# Patient Record
Sex: MALE | Race: WHITE | Employment: OTHER | URBAN - METROPOLITAN AREA
[De-identification: names, ages, dates, MRNs, and addresses within clinical notes are randomized per-mention and may not be internally consistent; named-entity substitution may affect disease eponyms.]

---

## 2017-03-24 ENCOUNTER — HOSPITAL ENCOUNTER (OUTPATIENT)
Dept: LAB | Age: 67
Discharge: HOME | End: 2017-03-24

## 2017-09-29 ENCOUNTER — HOSPITAL ENCOUNTER (OUTPATIENT)
Dept: LAB | Age: 67
Discharge: HOME | End: 2017-09-29

## 2018-05-18 ENCOUNTER — HOSPITAL ENCOUNTER (OUTPATIENT)
Dept: LAB | Age: 68
Discharge: HOME | End: 2018-05-18

## 2018-06-11 ENCOUNTER — HOSPITAL ENCOUNTER (OUTPATIENT)
Dept: LAB | Age: 68
Discharge: HOME | End: 2018-06-11

## 2018-11-16 ENCOUNTER — HOSPITAL ENCOUNTER (OUTPATIENT)
Dept: LAB | Age: 68
Discharge: HOME | End: 2018-11-16

## 2021-03-08 ENCOUNTER — ANESTHESIA EVENT (OUTPATIENT)
Dept: SURGERY | Age: 71
End: 2021-03-08
Payer: MEDICARE

## 2021-03-08 NOTE — ANESTHESIA PREPROCEDURE EVALUATION
Relevant Problems   No relevant active problems       Anesthetic History   No history of anesthetic complications            Review of Systems / Medical History  Patient summary reviewed, nursing notes reviewed and pertinent labs reviewed    Pulmonary  Within defined limits                 Neuro/Psych   Within defined limits           Cardiovascular    Hypertension: well controlled          CAD (s/p CABG) and hyperlipidemia    Exercise tolerance: >4 METS  Comments: H/o CAD s/p CABG. Cleared by [facility] 3/5/21.    GI/Hepatic/Renal     GERD: well controlled           Endo/Other        Obesity (BMI 35.3)     Other Findings   Comments: ED  BPH  Raynaud's Dz         Physical Exam    Airway  Mallampati: II  TM Distance: 4 - 6 cm  Neck ROM: normal range of motion   Mouth opening: Normal     Cardiovascular               Dental  No notable dental hx       Pulmonary                 Abdominal  GI exam deferred       Other Findings            Anesthetic Plan    ASA: 2  Anesthesia type: MAC          Induction: Intravenous  Anesthetic plan and risks discussed with: Patient

## 2021-03-11 RX ORDER — FAMOTIDINE 40 MG/1
40 TABLET, FILM COATED ORAL DAILY
COMMUNITY

## 2021-03-11 RX ORDER — SIMVASTATIN 40 MG/1
40 TABLET, FILM COATED ORAL
COMMUNITY

## 2021-03-11 RX ORDER — AMOXICILLIN 500 MG/1
1000 CAPSULE ORAL
COMMUNITY

## 2021-03-11 RX ORDER — CYCLOBENZAPRINE HCL 10 MG
TABLET ORAL
COMMUNITY

## 2021-03-11 RX ORDER — POTASSIUM CHLORIDE 750 MG/1
20 TABLET, EXTENDED RELEASE ORAL DAILY
COMMUNITY

## 2021-03-11 RX ORDER — EZETIMIBE 10 MG/1
10 TABLET ORAL
COMMUNITY

## 2021-03-11 RX ORDER — COLESEVELAM HYDROCHLORIDE 3.75 G/1
3.75 POWDER, FOR SUSPENSION ORAL
COMMUNITY

## 2021-03-11 RX ORDER — METOPROLOL SUCCINATE 100 MG/1
100 TABLET, EXTENDED RELEASE ORAL DAILY
COMMUNITY

## 2021-03-11 RX ORDER — TAMSULOSIN HYDROCHLORIDE 0.4 MG/1
0.4 CAPSULE ORAL DAILY
COMMUNITY

## 2021-03-11 RX ORDER — ASPIRIN 81 MG/1
81 TABLET ORAL DAILY
COMMUNITY

## 2021-03-11 RX ORDER — LISINOPRIL 40 MG/1
40 TABLET ORAL DAILY
COMMUNITY

## 2021-03-11 NOTE — PERIOP NOTES
[address] [place] [address]  SURGICAL PRE-ADMISSION INSTRUCTIONS    ARRIVAL  · You will be called the day before your surgery with your expected arrival time. · Sign in at the  of the hospital.  You will be directed to the Surgical Waiting Room. · Please arrive at your scheduled appointment time. You have been scheduled to arrive for your procedure one or two hours prior to the expected start time of your procedure. · Every effort will be made to minimize your wait but please be aware that unforeseen circumstances may affect our schedule. EATING  · DO NOT EAT OR DRINK ANYTHING AFTER MIDNIGHT ON THE EVENING BEFORE YOUR SURGERY OR ON THE DAY OF YOUR SURGERY except for your medications (as instructed) with a sip of water. · Do not use gum, mints or lozenges on the morning of your surgery. · Please do not smoke or chew tobacco before your surgery. MEDICATIONS   · Take the following medications on the morning of your surgery with the smallest amount of water possible : Famotidine, Metoprolol    STOP THESE MEDICATIONS AT THE TIMES LISTED BELOW  none   DRIVING/TRANSPORATION  · Have a responsible adult to drive you home from the hospital and to stay with you over night. Please have them plan to remain in the hospital during your surgery. Your surgery will not be done if you do not have a responsible adult to take you home and to stay with you. · If you have arranged for public transport, you must have a responsible adult to ride with you (who is not the ). · You may not drive for 24 hours after anesthesia. PREPARATION  · If you have a Living WiIl/Advance Directive, please bring a copy with you to scan into your chart. · Please DO NOT wear makeup or nail polish  · Please leave valuables at home,  DO NOT wear jewelry. · Wear loose, comfortable clothing that is large enough to cover a bulky dressing.     SPECIAL INSTRUCTIONS:  · none        Patient:  CHRISSIE Mirza Ermias Douglas   Date:     March 11, 2021  Time:   11:25 AM    RN:  Kayce Lemus RN    Date:     March 11, 2021  Time:   11:25 AM

## 2021-03-12 PROBLEM — H25.12 AGE-RELATED NUCLEAR CATARACT, LEFT EYE: Chronic | Status: ACTIVE | Noted: 2021-03-12

## 2021-03-15 ENCOUNTER — HOSPITAL ENCOUNTER (OUTPATIENT)
Age: 71
Setting detail: OUTPATIENT SURGERY
Discharge: HOME | End: 2021-03-15
Attending: OPHTHALMOLOGY | Admitting: OPHTHALMOLOGY
Payer: MEDICARE

## 2021-03-15 ENCOUNTER — ANESTHESIA (OUTPATIENT)
Dept: SURGERY | Age: 71
End: 2021-03-15
Payer: MEDICARE

## 2021-03-15 VITALS
WEIGHT: 235.45 LBS | TEMPERATURE: 97.2 F | HEIGHT: 70 IN | BODY MASS INDEX: 33.71 KG/M2 | RESPIRATION RATE: 18 BRPM | HEART RATE: 59 BPM | DIASTOLIC BLOOD PRESSURE: 74 MMHG | OXYGEN SATURATION: 97 % | SYSTOLIC BLOOD PRESSURE: 147 MMHG

## 2021-03-15 PROBLEM — H25.12 AGE-RELATED NUCLEAR CATARACT, LEFT EYE: Chronic | Status: RESOLVED | Noted: 2021-03-12 | Resolved: 2021-03-15

## 2021-03-15 PROCEDURE — 2709999900 HC NON-CHARGEABLE SUPPLY: Performed by: OPHTHALMOLOGY

## 2021-03-15 PROCEDURE — 74011250636 HC RX REV CODE- 250/636: Performed by: OPHTHALMOLOGY

## 2021-03-15 PROCEDURE — 77030013353: Performed by: OPHTHALMOLOGY

## 2021-03-15 PROCEDURE — 74011250636 HC RX REV CODE- 250/636: Performed by: ANESTHESIOLOGY

## 2021-03-15 PROCEDURE — 76060000031 HC ANESTHESIA FIRST 0.5 HR: Performed by: OPHTHALMOLOGY

## 2021-03-15 PROCEDURE — 74011000250 HC RX REV CODE- 250

## 2021-03-15 PROCEDURE — 77030013987 HC SLV OPTH IRR ALCN -B: Performed by: OPHTHALMOLOGY

## 2021-03-15 PROCEDURE — 76010000154 HC OR TIME FIRST 0.5 HR: Performed by: OPHTHALMOLOGY

## 2021-03-15 PROCEDURE — 77030020828: Performed by: OPHTHALMOLOGY

## 2021-03-15 PROCEDURE — 74011000250 HC RX REV CODE- 250: Performed by: OPHTHALMOLOGY

## 2021-03-15 PROCEDURE — V2632 POST CHMBR INTRAOCULAR LENS: HCPCS | Performed by: OPHTHALMOLOGY

## 2021-03-15 PROCEDURE — 76210000020 HC REC RM PH II FIRST 0.5 HR: Performed by: OPHTHALMOLOGY

## 2021-03-15 PROCEDURE — 77030035283: Performed by: OPHTHALMOLOGY

## 2021-03-15 PROCEDURE — 77030014067 HC TIP PHACO KLMN ALCN -B: Performed by: OPHTHALMOLOGY

## 2021-03-15 PROCEDURE — 77030007855 HC KNF OPHTH IKNF ALCN -A: Performed by: OPHTHALMOLOGY

## 2021-03-15 RX ORDER — TROPICAMIDE 10 MG/ML
1 SOLUTION/ DROPS OPHTHALMIC
Status: DISCONTINUED | OUTPATIENT
Start: 2021-03-15 | End: 2021-03-15 | Stop reason: HOSPADM

## 2021-03-15 RX ORDER — TOBRAMYCIN AND DEXAMETHASONE 3; 1 MG/ML; MG/ML
1 SUSPENSION/ DROPS OPHTHALMIC AS NEEDED
Status: COMPLETED | OUTPATIENT
Start: 2021-03-15 | End: 2021-03-15

## 2021-03-15 RX ORDER — TETRACAINE HYDROCHLORIDE 5 MG/ML
1 SOLUTION OPHTHALMIC AS NEEDED
Status: COMPLETED | OUTPATIENT
Start: 2021-03-15 | End: 2021-03-15

## 2021-03-15 RX ORDER — PROPOFOL 10 MG/ML
INJECTION, EMULSION INTRAVENOUS AS NEEDED
Status: DISCONTINUED | OUTPATIENT
Start: 2021-03-15 | End: 2021-03-15 | Stop reason: HOSPADM

## 2021-03-15 RX ORDER — PHENYLEPHRINE HYDROCHLORIDE 100 MG/ML
1 SOLUTION/ DROPS OPHTHALMIC
Status: DISCONTINUED | OUTPATIENT
Start: 2021-03-15 | End: 2021-03-15 | Stop reason: HOSPADM

## 2021-03-15 RX ORDER — TETRACAINE HYDROCHLORIDE 5 MG/ML
SOLUTION OPHTHALMIC AS NEEDED
Status: DISCONTINUED | OUTPATIENT
Start: 2021-03-15 | End: 2021-03-15 | Stop reason: HOSPADM

## 2021-03-15 RX ORDER — MIDAZOLAM HYDROCHLORIDE 1 MG/ML
INJECTION, SOLUTION INTRAMUSCULAR; INTRAVENOUS AS NEEDED
Status: DISCONTINUED | OUTPATIENT
Start: 2021-03-15 | End: 2021-03-15 | Stop reason: HOSPADM

## 2021-03-15 RX ORDER — KETOROLAC TROMETHAMINE 5 MG/ML
1 SOLUTION OPHTHALMIC
Status: DISCONTINUED | OUTPATIENT
Start: 2021-03-15 | End: 2021-03-15 | Stop reason: HOSPADM

## 2021-03-15 RX ORDER — SODIUM CHLORIDE, SODIUM LACTATE, POTASSIUM CHLORIDE, CALCIUM CHLORIDE 600; 310; 30; 20 MG/100ML; MG/100ML; MG/100ML; MG/100ML
25 INJECTION, SOLUTION INTRAVENOUS CONTINUOUS
Status: DISCONTINUED | OUTPATIENT
Start: 2021-03-15 | End: 2021-03-15 | Stop reason: HOSPADM

## 2021-03-15 RX ORDER — LIDOCAINE HYDROCHLORIDE 20 MG/ML
2 INJECTION, SOLUTION EPIDURAL; INFILTRATION; INTRACAUDAL; PERINEURAL ONCE
Status: COMPLETED | OUTPATIENT
Start: 2021-03-15 | End: 2021-03-15

## 2021-03-15 RX ADMIN — PHENYLEPHRINE HYDROCHLORIDE 1 DROP: 100 SOLUTION/ DROPS OPHTHALMIC at 12:43

## 2021-03-15 RX ADMIN — TETRACAINE HYDROCHLORIDE 1 DROP: 5 SOLUTION OPHTHALMIC at 12:57

## 2021-03-15 RX ADMIN — TETRACAINE HYDROCHLORIDE 1 DROP: 5 SOLUTION OPHTHALMIC at 12:43

## 2021-03-15 RX ADMIN — TROPICAMIDE 1 DROP: 10 SOLUTION/ DROPS OPHTHALMIC at 12:34

## 2021-03-15 RX ADMIN — KETOROLAC TROMETHAMINE 1 DROP: 5 SOLUTION OPHTHALMIC at 12:43

## 2021-03-15 RX ADMIN — TROPICAMIDE 1 DROP: 10 SOLUTION/ DROPS OPHTHALMIC at 12:43

## 2021-03-15 RX ADMIN — SODIUM CHLORIDE, POTASSIUM CHLORIDE, SODIUM LACTATE AND CALCIUM CHLORIDE 25 ML/HR: 600; 310; 30; 20 INJECTION, SOLUTION INTRAVENOUS at 12:36

## 2021-03-15 RX ADMIN — PROPOFOL 20 MG: 10 INJECTION, EMULSION INTRAVENOUS at 13:20

## 2021-03-15 RX ADMIN — KETOROLAC TROMETHAMINE 1 DROP: 5 SOLUTION OPHTHALMIC at 12:34

## 2021-03-15 RX ADMIN — LIDOCAINE HYDROCHLORIDE 2 DROP: 35 GEL OPHTHALMIC at 12:57

## 2021-03-15 RX ADMIN — PHENYLEPHRINE HYDROCHLORIDE 1 DROP: 100 SOLUTION/ DROPS OPHTHALMIC at 12:34

## 2021-03-15 RX ADMIN — MIDAZOLAM 1 MG: 1 INJECTION INTRAMUSCULAR; INTRAVENOUS at 13:11

## 2021-03-15 RX ADMIN — PROPOFOL 30 MG: 10 INJECTION, EMULSION INTRAVENOUS at 13:13

## 2021-03-15 RX ADMIN — TETRACAINE HYDROCHLORIDE 1 DROP: 5 SOLUTION OPHTHALMIC at 12:34

## 2021-03-15 RX ADMIN — MIDAZOLAM 1 MG: 1 INJECTION INTRAMUSCULAR; INTRAVENOUS at 13:13

## 2021-03-15 NOTE — ANESTHESIA POSTPROCEDURE EVALUATION
Post-Anesthesia Evaluation and Assessment    Cardiovascular Function/Vital Signs  Visit Vitals  BP (!) 147/74   Pulse (!) 59   Temp 36.2 °C (97.2 °F)   Resp 18   Ht 5' 10\" (1.778 m)   Wt 106.8 kg (235 lb 7.2 oz)   SpO2 97%   BMI 33.78 kg/m²       Patient is status post Procedure(s) with comments:  LEFT CATARACT EXTRACTION WITH INTRA OCULAR LENS IMPLANT - doc 3/11/21  CDE-8.28  TIME-51.7  POWER-6. Nausea/Vomiting: Controlled. Postoperative hydration reviewed and adequate. Pain:  Pain Scale 1: Numeric (0 - 10) (03/15/21 1336)  Pain Intensity 1: 0 (03/15/21 1336)   Managed. Neurological Status:   Neuro (WDL): Within Defined Limits (03/15/21 1224)   At baseline. Mental Status and Level of Consciousness: Arousable. Pulmonary Status:   O2 Device: Room air (03/15/21 1339)   Adequate oxygenation and airway patent. Complications related to anesthesia: None    Post-anesthesia assessment completed. No concerns. Patient has met all discharge requirements. Signed By: Bessy Sorenson MD    March 15, 2021               Procedure(s):  LEFT CATARACT EXTRACTION WITH INTRA OCULAR LENS IMPLANT.     MAC    <[email or web address]>    INITIAL Post-op Vital signs:   Vitals Value Taken Time   /74 03/15/21 1339   Temp 36.2 °C (97.2 °F) 03/15/21 1339   Pulse 59 03/15/21 1339   Resp 18 03/15/21 1339   SpO2 97 % 03/15/21 1339

## 2021-03-15 NOTE — BRIEF OP NOTE
Brief Postoperative Note    Patient: Marni Sen  YOB: 1950  MRN: [id number]    Date of Procedure: 3/15/2021     Pre-Op Diagnosis: Cataract, nuclear sclerotic, left eye [H25.12]    Post-Op Diagnosis: pseudophakia left eye      Procedure(s):  LEFT CATARACT EXTRACTION WITH INTRA OCULAR LENS IMPLANT    Surgeon(s):  Noemi Estrada MD    Surgical Assistant: None    Anesthesia: MAC     Estimated Blood Loss (mL): none    Complications: None    Specimens: * No specimens in log *     Implants:   Implant Name Type Inv.  Item Serial No.  Lot No. LRB No. Used Action   ACRYSOF IQ Intraocular Lens  [id number] [facility]  Left 1 Implanted       Drains: * No LDAs found *    Findings: cataract    Electronically Signed by Anthony Fay MD on 3/15/2021 at 1:39 PM

## 2021-03-15 NOTE — DISCHARGE INSTRUCTIONS
[facility]      POST OPERATIVE INSTRUCTIONS AND INFORMATION         1. THE FIRST 24 HOURS AFTER SURGERY, YOU WILL BE ASKED TO REMAIN AT BEDREST WITH YOUR HEAD ELEVATED AT 39 DEGREES THIS CAN BE DONE BY SLEEPING ON THE PILLOWS OR IN A RECLINER. YOU CAN GET UP AS NECESSARY. YOUR EYE SHIELD MUST REMAIN FIRMLY IN PLACE FOR THE FIRST 24 HOURS. IT WILL BE REMOVED IN THE OFFICE ON THE DAY FOLLOWING SURGERY WHEN I EXAMINE YOUR EYE. 2.  YOU WILL NEED TO BRING ALL EYE MEDICATIONS TO YOUR APPOINTMENT THE DAY AFTER SURGERY. 3.   YOUR POST OP VISIT SCHEDULE IS AS FOLLOWS:             * ONE DAY AFTER SURGERY             * ONE WEEK AFTER SURGERY             * SIX WEEKS AFTER SURGERY (SPECTACLE REFRACTION AND DILATED EXAMINATION)    4. IT IS IMPORTANT THAT YOU WEAR EYE PROTECTION AT ALL TIMES FOR THE      FIRST WEEK AFTER [facility] CATARACT SURGERY. DURING THE DAY, YOU WILL NEED TO WEAR EITHER OUR CURRENT SPECTACLES WITH A PLAIN WINDOW GLASS LENS OR YOU MAY WISH TO PURCHASE A PAIR OF DRUG STORE BIFOCALS WITH A POWER OF +2.50 OR SO. WHEN OUTSIDE, YOU MUST WEAR THE SOLAR DAVALOS THAT ARE PROVIDED FOR YOU. AT BEDTIME, YOU MUST WEAR THE EYE SHIELD THAT IS ALSO PROVIDED. AGAIN THIS IS FOR THE FIRST WEEK FOLLOWING YOUR SURGERY. 5. IMPORTANT DOS AND DONTS:             *AVOID CONSTIPATION             *DO NOT PARTICIPATE IN SPORTS OR STRENUOUS PHYSICAL ACTIVITY INCLUDING                         SEXUAL RELATIONS             *DO NOT DRIVE FOR ONE WEEK OFTER EACH CATARACT SURGERY             *AVOID POWDERS, SPRAYS, OR OTHER THINGS THAT MIGHT GET IN YOUR EYES             *DO NOT RUB YOUR EYE OR PUT ANY PRESSURE ON YOUR EYE    6. IF YOUR HAIR IS WASHED BY SOMEONE ELSE, HAVE THEM POSITION YOU SO THAT YOU LEAN YOUR HEAD BACKWARDS INTO THE SINK TO AVOID SOAPY WATER FROM GETTING IN YOUR EYES. YOU SHOULD ALSO WEAR YOUR EYE SHIELD TO PREVENT INJURY OR CONTAMINATION TO THE EYE.             7. DO NOT HESITATE TO CALL OUR OFFICE IF YOU FEEL SOMETHING IS NOT RIGHT OR YOU HAVE ANY QUESTIONS, UNUSUAL SYMPTOMS, OR SUDDEN CHANGES IN YOUR VISION. 8. OUR [facility]ONE [facility] OFFICE              [phone]              *[place] OFFICE         [phone]    9. Anjel [address] YOU FOR ENTRUSTING YOUR EYE CARE TO US.

## 2021-03-15 NOTE — INTERVAL H&P NOTE
The patient was counseled at length about the risks of rachelle Covid-19 during their perioperative period and any recovery window from their procedure. The patient was made aware that rachelle Covid-19  may worsen their prognosis for recovering from their procedure and lend to a higher morbidity and/or mortality risk. All material risks, benefits, and reasonable alternatives including postponing the procedure were discussed. The patient does  wish to proceed with the procedure at this time. Update History & Physical 
 
The Patient's History and Physical of March 5, 2021 was reviewed with the patient and I examined the patient. There was no change. The surgical site was confirmed by the patient and me. Plan:  The risk, benefits, expected outcome, and alternative to the recommended procedure have been discussed with the patient. Patient understands and wants to proceed with the procedure.  
 
Electronically signed by Salome Schmitt MD on 3/15/2021 at 1:03 PM

## 2021-03-16 NOTE — OP NOTES
[facility]  OPERATIVE REPORT    Name:  Cynthia Damon  MR#:  [id number]  :  1950  ACCOUNT #:  [de-identified]  DATE OF SERVICE:  03/15/2021    PREOPERATIVE DIAGNOSIS:  Age-related nuclear cataract, left eye. POSTOPERATIVE DIAGNOSIS:  Pseudophakia, left eye. PROCEDURE PERFORMED:  Phacoemulsification of cataract, left eye, with intraocular lens implant. SURGEON:  Ruby Rico MD    ASSISTANT:  None. ANESTHESIA:  Topical 3.5% lidocaine gel, 0.5% tetracaine drops, IV sedation by Anesthesia, and 2% preservative-free intraocular lidocaine. COMPLICATIONS:  None. SPECIMENS REMOVED:  None. IMPLANTS:  IOL. ESTIMATED BLOOD LOSS:  None. HISTORY:  This 77-year-old white male noted painless progressive decreased visual acuity in his left eye, over the last 6 months, affecting distance and near vision, driving and reading, and not improved by refraction. He presents for an elective extracapsular cataract extraction with lens implant in his left eye to improve vision and lifestyle. He has a history of elevated cholesterol, ED, hypertension, benign prostatic hypertrophy, and Raynaud's disease affecting the fingers and feet. Has had a CABG. 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 He has no known medicine allergies. His current medications include potassium chloride, famotidine, cyclobenzaprine, simvastatin, low-dose aspirin, Viagra, Welchol, lisinopril, metoprolol, tamsulosin, and Zetia. He was cleared for surgery by Jennifer Ken, nurse practitioner. Ocular examination at the time of admission revealed the best corrected acuity of 20/20 in the right eye and 20/70 in the left eye with intraocular pressures of 18 mmHg in the right eye and 13 mmHg in the left eye. Extraocular examination reveals a slightly constricted visual field on the left eye.   Full motility, acne rosacea, ptosis, exophthalmos, and dermatochalasis, both eyes. Anterior segment shows normal conjunctivae with clear corneas, open angles, and deeper chambers. A round and reactive pupil on the right and a fixed, dilated pupil on the left. He has a 4+ nuclear sclerotic lens in the right eye and a 5+ nuclear sclerotic lens with posterior subcapsular cataract in the left eye. Again, even though he has a fixed dilated pupil and a dense cataract with posterior subcapsular change, the patient denies any previous trauma. Dilated fundus shows a 0.5 optic pit in both eyes with normal blood vessels, dull foveal reflexes, and posterior vitreous separation with debris. PROCEDURE:  The patient was taken to the main operating room after receiving pupillary dilation, application of Honan balloon and topical anesthesia in the preop area, placed in the supine position and given IV sedation by Anesthesia. The patient was prepped and draped in the standard fashion for intraocular microsurgery of the left eye with a temporal approach. A limbal paracentesis was made with a 15-degree blade and the anterior chamber filled with Viscoat. A lino keratome was used to enter the anterior chamber from the temporal limbus in a four-plane fashion creating a 3 mm self-sealing corneal tunnel incision. An anterior capsulorrhexis was performed with capsulorrhexis forceps followed by hydrodissection and hydrodelineation of the nucleus in the capsular bag with balanced salt solution achieving rotation. A CDE of 8.28, phacoemulsification time of 51.7 seconds, at an average power of 6% was required to remove the nucleus in a phaco chop technique in burst mode at high vacuum using OZil technology followed by irrigation/aspiration of the remaining cortex and vacuum polishing of the posterior capsule.   The capsular bag was then filled with Provisc and an Addi AcrySof posterior chamber foldable acrylic intraocular lens, model [id number], power +20.5 diopters, was injected into the capsular bag using an Addi cartridge and the lens centered. Irrigation/aspiration was used to remove the Provisc from the anterior chamber and capsular bag. The anterior chamber was filled with balanced salt solution and the incisions tested and found to be watertight without a suture. A collagen shield soaked in TobraDex ophthalmic drops and tetracaine drops was placed on the cornea followed by Pred-G ophthalmic ointment. The speculum and drape were then removed and an eye shield taped over the eye. The patient tolerated the procedure well and left the operating room for the step-down unit under the care of Anesthesia, in a satisfactory postop condition, alert and awake. The patient is to be discharged home when released by Anesthesia, to remain at bed rest with head elevated for the next 24 hours, resume all customary preop diet and medications and take Tylenol as needed for discomfort. The patient has a followup appointment in my office in [address] [place] [address] on 03/16/2021, at 2:45 p.m.       Ursula Bethea MD      [id number]  D:  03/15/2021 13:48  T:  03/16/2021 0:01  JOB #:  [id number]

## (undated) DEVICE — MICROSURGICAL INSTRUMENT CAPSULE POLISHER-37 BEND, 21GA W .3MM SIDE PORT: Brand: ALCON

## (undated) DEVICE — B-H IRRIGATING CAN 19GA FLAT ANGLED 8MM: Brand: OPHTHALMIC CANNULA

## (undated) DEVICE — OPHTHALMIC KNIFE 15°: Brand: ALCON

## (undated) DEVICE — BETADINE 5% EYE SOL

## (undated) DEVICE — TAPE ADH W1INXL10YD CLTH SILK H2O RESIST CURAD

## (undated) DEVICE — MICROSURGICAL INSTRUMENT ANTERIOR CHAMBER CANNULA 30GA: Brand: ALCON

## (undated) DEVICE — SOLUTION IRRIG 250ML STRL H2O PLAS POUR BTL USP

## (undated) DEVICE — TIPLESS W/ 0.9 MM HIGH INFUSION SLEEVES: Brand: ALCON, INFINITI, MICROSMOOTH

## (undated) DEVICE — 45° KELMAN®, 0.9 MM TURBOSONICS® MINI-FLARED ABS® TIP: Brand: ALCON, KELMAN, TURBOSONICS, MINI-FLARED ABS

## (undated) DEVICE — SOL IRR SOD CL 0.9% 250ML BTL --

## (undated) DEVICE — KIT PT CARE CATRCT POSTOP CUST

## (undated) DEVICE — STERILE POLYISOPRENE POWDER-FREE SURGICAL GLOVES: Brand: PROTEXIS

## (undated) DEVICE — TAPE ADH CLTH SILK H2O REPELLENT CURAD

## (undated) DEVICE — Device

## (undated) DEVICE — CANN CYSTITM FRM REV CUT 25G